# Patient Record
(demographics unavailable — no encounter records)

---

## 2025-06-28 NOTE — ASSESSMENT
[FreeTextEntry1] : 42 y/o F with left sided neck pain and LUE radiculopathy, which has recently worsened over the past month. No fxs, instability, collapses or deformities seen on today's xr's. No red flags on exam. Tried PT exercises guided by therapist, helped but remains with some discomfort.   - Discussed given persistent and worsen symptoms will obtain MRI and possible referral to pain mgmt for [facility].  - meloxicam prn - F/up after MRI

## 2025-06-28 NOTE — HISTORY OF PRESENT ILLNESS
[Neck] : neck [2] : 2 [Dull/Aching] : dull/aching [Radiating] : radiating [Tingling] : tingling [Intermittent] : intermittent [Nothing helps with pain getting better] : Nothing helps with pain getting better [de-identified] : 6/26/25 here for follow up, reports recent flare up of neck pain in May with no acute trauma or injury. Continues to have neck pain and LUE pain. Taking advil prn with no improvement. has been doing HEP for maintenance.  6/20/24: 44 yo F presenting with neck pain that started 03/2024. Sudden neck pain. Radiates into left shoulder; down left arm to thumb w/pins & needles. Tried PT guided by friend therapist; referred here. No intense pain, more of a discomfort feeling, exacerbates when seating against hard support.  [] : no [FreeTextEntry7] : left arm

## 2025-06-28 NOTE — IMAGING
[No bony abnormalities] : No bony abnormalities [No spinal deformity, fracture, lytic lesion, or marked single level collapse] : No spinal deformity, fracture, lytic lesion, or marked single level collapse [No instability seen on flexion/extension] : No instability seen on flexion/extension [de-identified] :  C Spine Inspection: No defects or deformities Palpation: No tenderness or spasms in trapezial, rhomboid or paracervical musculature ROM: Full with stiffness Strength: 5/5 b/l deltoid, biceps, triceps, wrist flexors, wrist extensors, abductors Neuro: Sensation I LT Negative Anne's b/l + L Spurling

## 2025-06-28 NOTE — IMAGING
[No bony abnormalities] : No bony abnormalities [No spinal deformity, fracture, lytic lesion, or marked single level collapse] : No spinal deformity, fracture, lytic lesion, or marked single level collapse [No instability seen on flexion/extension] : No instability seen on flexion/extension [de-identified] :  C Spine Inspection: No defects or deformities Palpation: No tenderness or spasms in trapezial, rhomboid or paracervical musculature ROM: Full with stiffness Strength: 5/5 b/l deltoid, biceps, triceps, wrist flexors, wrist extensors, abductors Neuro: Sensation I LT Negative Anne's b/l + L Spurling

## 2025-06-28 NOTE — ASSESSMENT
[FreeTextEntry1] : 44 y/o F with left sided neck pain and LUE radiculopathy, which has recently worsened over the past month. No fxs, instability, collapses or deformities seen on today's xr's. No red flags on exam. Tried PT exercises guided by therapist, helped but remains with some discomfort.   - Discussed given persistent and worsen symptoms will obtain MRI and possible referral to pain mgmt for [facility].  - meloxicam prn - F/up after MRI

## 2025-06-28 NOTE — HISTORY OF PRESENT ILLNESS
[Neck] : neck [2] : 2 [Dull/Aching] : dull/aching [Radiating] : radiating [Tingling] : tingling [Intermittent] : intermittent [Nothing helps with pain getting better] : Nothing helps with pain getting better [de-identified] : 6/26/25 here for follow up, reports recent flare up of neck pain in May with no acute trauma or injury. Continues to have neck pain and LUE pain. Taking advil prn with no improvement. has been doing HEP for maintenance.  6/20/24: 44 yo F presenting with neck pain that started 03/2024. Sudden neck pain. Radiates into left shoulder; down left arm to thumb w/pins & needles. Tried PT guided by friend therapist; referred here. No intense pain, more of a discomfort feeling, exacerbates when seating against hard support.  [] : no [FreeTextEntry7] : left arm

## 2025-07-08 NOTE — ASSESSMENT
[FreeTextEntry1] : 42 y/o F with left sided neck pain and LUE radiculopathy x 2 months. MRI: multilevel moderate spondylosis from C3-6; severe R & moderate L NF stenosis at C56. Currently sxs are stable.  - Recommend a course of PT  - If/when sxs flare up with consider TPI and/or pain mgmt for [facility].  - Assured Megan if conservative measures fail, she would benefit from sx: 1 level CDR at C56.  - F/up in 6 weeks. - Time based : 40 min

## 2025-07-08 NOTE — DATA REVIEWED
[MRI] : MRI [Cervical Spine] : cervical spine [Report was reviewed and noted in the chart] : The report was reviewed and noted in the chart [I independently reviewed and interpreted images and report] : I independently reviewed and interpreted images and report [FreeTextEntry1] : @[facility] C Spine MRI 7/1/25 1. Moderate diffuse spondylosis, mostly noted from C3-C6.  2. C4-5 R HNP with mod-severe NF stenosis.  3. C5-6 severe R NF stenosis & mild-mod L NF stenosis.  4. C6-7 R>L mild-mod NF stenosis

## 2025-07-08 NOTE — IMAGING
[No bony abnormalities] : No bony abnormalities [No spinal deformity, fracture, lytic lesion, or marked single level collapse] : No spinal deformity, fracture, lytic lesion, or marked single level collapse [No instability seen on flexion/extension] : No instability seen on flexion/extension [de-identified] :  C Spine Inspection: No defects or deformities Palpation: No tenderness or spasms in trapezial, rhomboid or paracervical musculature ROM: Full with stiffness Strength: 5/5 b/l deltoid, biceps, triceps, wrist flexors, wrist extensors, abductors Neuro: Sensation I LT Negative Anne's b/l + L Spurling

## 2025-07-08 NOTE — HISTORY OF PRESENT ILLNESS
[Neck] : neck [2] : 2 [Dull/Aching] : dull/aching [Radiating] : radiating [Tingling] : tingling [Intermittent] : intermittent [Nothing helps with pain getting better] : Nothing helps with pain getting better [de-identified] : 7/8/25: Follow up C Spine. Doing okay for the last couple of days. Pain exacerbates when laying down. MRI review @[facility].   6/26/25 here for follow up, reports recent flare up of neck pain in May with no acute trauma or injury. Continues to have neck pain and LUE pain. Taking advil prn with no improvement. has been doing HEP for maintenance.  6/20/24: 42 yo F presenting with neck pain that started 03/2024. Sudden neck pain. Radiates into left shoulder; down left arm to thumb w/pins & needles. Tried PT guided by friend therapist; referred here. No intense pain, more of a discomfort feeling, exacerbates when seating against hard support.  [] : no [FreeTextEntry7] : left arm